# Patient Record
Sex: MALE | Race: WHITE | URBAN - METROPOLITAN AREA
[De-identification: names, ages, dates, MRNs, and addresses within clinical notes are randomized per-mention and may not be internally consistent; named-entity substitution may affect disease eponyms.]

---

## 2019-03-22 ENCOUNTER — OFFICE VISIT (OUTPATIENT)
Dept: INTERNAL MEDICINE CLINIC | Age: 44
End: 2019-03-22

## 2019-03-22 VITALS
TEMPERATURE: 98.1 F | HEIGHT: 73 IN | HEART RATE: 57 BPM | SYSTOLIC BLOOD PRESSURE: 110 MMHG | OXYGEN SATURATION: 97 % | DIASTOLIC BLOOD PRESSURE: 78 MMHG | WEIGHT: 201 LBS | RESPIRATION RATE: 18 BRPM | BODY MASS INDEX: 26.64 KG/M2

## 2019-03-22 DIAGNOSIS — G89.29 CHRONIC LOW BACK PAIN WITHOUT SCIATICA, UNSPECIFIED BACK PAIN LATERALITY: ICD-10-CM

## 2019-03-22 DIAGNOSIS — E78.2 MIXED HYPERLIPIDEMIA: ICD-10-CM

## 2019-03-22 DIAGNOSIS — E03.8 SUBCLINICAL HYPOTHYROIDISM: ICD-10-CM

## 2019-03-22 DIAGNOSIS — R10.30 LOWER ABDOMINAL PAIN: ICD-10-CM

## 2019-03-22 DIAGNOSIS — R00.1 BRADYCARDIA: Primary | ICD-10-CM

## 2019-03-22 DIAGNOSIS — M54.50 CHRONIC LOW BACK PAIN WITHOUT SCIATICA, UNSPECIFIED BACK PAIN LATERALITY: ICD-10-CM

## 2019-03-22 PROBLEM — Z00.00 ENCOUNTER FOR WELLNESS EXAMINATION: Status: ACTIVE | Noted: 2019-03-22

## 2019-03-22 NOTE — PROGRESS NOTES
HPI:  
 
This patient presents to the office for screening/wellness exam and his records from old practice not available for review. [employer] database was interrogated and data populated into EMR. His allergy and medication list were both reviewed and reconciled. He had a recent insurance physical with laboratory testing done with results remarkable for elevated TSH of 6 with normal free T4 and T3. He has been diagnosed with subclinical hypothyroidism with negative TPO antibody. He has not required pharmacotherapy. He also has persistent mild IGT although his A1C remains in the normal range. This has not changed over the past 6 years. His diet is restricted and there is no predisposing history towards diabetes. He is concerned about development of recurring episodes of fullness and tightness along the lower abdomen and groin as if his genitals were being squeezed. This occurs usually after a full meal. This is associated with urinary hesitancy but denies any hematuria, hematospermia, melena, hematochezia. It resolves when he is fully relaxed. He is concerned about possibility of malignancy as his friend had [facility] diagnosed during executive physical. 
 
He has had intermittent back pain and has been informed by a spine specialist that he had \"bulging disc\" although he cannot remember if he actually had an MRI done. He denies any paresthesias or weakness of either leg. Past Medical History:  
Diagnosis Date  Subclinical hypothyroidism 3/22/2019 Past Surgical History:  
Procedure Laterality Date  HX ROTATOR CUFF REPAIR    
 HX WISDOM TEETH EXTRACTION No current outpatient medications on file. No current facility-administered medications for this visit. Allergies and Intolerances:  
No Known Allergies Family History:  
Family History Problem Relation Age of Onset  Anxiety Mother  Hypertension Father  Thyroid Disease Father  Anxiety Father  Anxiety Sister Social History: He  reports that he has never smoked. He has never used smokeless tobacco.  
Social History Substance and Sexual Activity Alcohol Use Yes  Alcohol/week: 0.6 oz  Types: 1 Shots of liquor per week Comment: socially Immunization History:               Avoids flu vaccine Diet:                                          Lean Protein Exercise:                                  Wayney Jamie, Core, Aerobic, Weight Training Review of Systems Constitutional: Negative for chills, fever and weight loss. HENT: Negative for congestion and hearing loss. Eyes: Negative for blurred vision. Respiratory: Negative for shortness of breath. Cardiovascular: Negative for chest pain, palpitations and leg swelling. Gastrointestinal: Negative for blood in stool, constipation, heartburn, melena and nausea. Musculoskeletal: Positive for back pain. Neurological: Negative for dizziness and headaches. Psychiatric/Behavioral: Negative for depression. Physical:  
Visit Vitals /78 (BP 1 Location: Left arm, BP Patient Position: Sitting) Pulse (!) 57 Temp 98.1 °F (36.7 °C) (Oral) Resp 18 Ht 6' 1\" (1.854 m) Wt 201 lb (91.2 kg) SpO2 97% BMI 26.52 kg/m² Physical Exam  
Constitutional: He is well-developed, well-nourished, and in no distress. Pleasant man with lean muscular build HENT:  
Nose: Nose normal.  
Mouth/Throat: Oropharynx is clear and moist.  
Protruding cauliflower ears (MMA trauma) Eyes: Conjunctivae and EOM are normal. No scleral icterus. Neck: No JVD present. No thyromegaly present. Cardiovascular: Normal rate, regular rhythm, normal heart sounds and intact distal pulses. Exam reveals no gallop. No murmur heard. Pulmonary/Chest: Breath sounds normal. He has no wheezes. He has no rales. Abdominal: Bowel sounds are normal. There is no hepatosplenomegaly. There is no tenderness. Genitourinary: He exhibits no abnormal testicular mass and no testicular tenderness. Penis exhibits no lesions. Genitourinary Comments: No hernia or inguinal adenopathy, circumcised phallus Musculoskeletal:  
No LS spine tenderness to 90 degree, near complete ROM both hips Lymphadenopathy:  
  He has no cervical adenopathy. Neurological: He has normal sensation and normal strength. Gait normal.  
Reflex Scores: 
     Patellar reflexes are 0 on the right side and 0 on the left side. Achilles reflexes are 0 on the right side and 0 on the left side. Skin:  
Multiple well demarcated hyperpigmented macules along back Vitals reviewed. Review of Data:     EKG sinus bradycardia with slow R wave progression, LS spine series with scoliosis and DDD changes Labs: 
No results found for any previous visit. Health Maintenance Due Topic Date Due  
 DTaP/Tdap/Td series (1 - Tdap) 09/19/1994  Influenza Age 5 to Adult  08/01/2018 Impression/Plan: 
 Patient Active Problem List  
Diagnosis  Code Lower abdominal/Groin discomfort likely from PFD but need to consider referred pain from lumbar disc disease L1/L2 disc disease U/A ordered, arrange bladder ultrasound, consider CT imaging, pelvic floor exercises R10.30 IGT, stable and chronic, (not concerning for JULIANN) Continue to monitor glucose, low CHO diet, repeat A1C next visit R73.09  
 Subclinical hypothyroidism, with rising TSH Continue to monitor clinically, obtain repeat TSH with TPO antibody E03.9  Mixed hyperlipidemia with ACC/AHA risk score 1.2% No indication for statin therapy E78.2  
 Encounter for wellness examination Flu vaccine declined, colonoscopy at 48 along with PSA, skin surveillance through [facility] Z00.00 Danica Perdue MD

## 2019-03-22 NOTE — PATIENT INSTRUCTIONS
Lower abdominal discomfort likely from pelvic floor dysfunction>>will check bladder ultrasound and U/A Subclinical hypothyroidism>>check TPO antibody Impaired glucose tolerance>>prediabetes Cholesterol slightly elevated but calculated risk only 1.2% Degenerative disc disease + scoliosis>>? MRI lumbar spine

## 2019-03-23 LAB
APPEARANCE UR: CLEAR
BACTERIA #/AREA URNS HPF: NORMAL /[HPF]
BILIRUB UR QL STRIP: NEGATIVE
CASTS URNS QL MICRO: NORMAL /LPF
COLOR UR: YELLOW
EPI CELLS #/AREA URNS HPF: NORMAL /HPF (ref 0–10)
GLUCOSE UR QL: NEGATIVE
HGB UR QL STRIP: NEGATIVE
KETONES UR QL STRIP: NEGATIVE
LEUKOCYTE ESTERASE UR QL STRIP: NEGATIVE
MICRO URNS: NORMAL
MICRO URNS: NORMAL
NITRITE UR QL STRIP: NEGATIVE
PH UR STRIP: 7 [PH] (ref 5–7.5)
PROT UR QL STRIP: NEGATIVE
RBC #/AREA URNS HPF: NORMAL /HPF (ref 0–2)
SP GR UR: 1.01 (ref 1–1.03)
THYROPEROXIDASE AB SERPL-ACNC: 9 IU/ML (ref 0–34)
TSH SERPL DL<=0.005 MIU/L-ACNC: 3.75 UIU/ML (ref 0.45–4.5)
UROBILINOGEN UR STRIP-MCNC: 0.2 MG/DL (ref 0.2–1)
WBC #/AREA URNS HPF: NORMAL /HPF (ref 0–5)

## 2019-03-24 ENCOUNTER — TELEPHONE (OUTPATIENT)
Dept: INTERNAL MEDICINE CLINIC | Age: 44
End: 2019-03-24

## 2019-03-24 DIAGNOSIS — M54.41 ACUTE BILATERAL LOW BACK PAIN WITH BILATERAL SCIATICA: Primary | ICD-10-CM

## 2019-03-24 DIAGNOSIS — R39.9 LOWER URINARY TRACT SYMPTOMS (LUTS): ICD-10-CM

## 2019-03-24 DIAGNOSIS — M54.42 ACUTE BILATERAL LOW BACK PAIN WITH BILATERAL SCIATICA: Primary | ICD-10-CM

## 2019-03-24 PROBLEM — N50.82 SCROTAL PAIN: Status: ACTIVE | Noted: 2019-03-24

## 2019-03-24 NOTE — TELEPHONE ENCOUNTER
Spoke to wife and informed her that 's TSH went back to NORMAL and TPO antibody was NEGATIVE along with U/A.     Orders for MRI and ultrasound placed and advised to call Jennifer Macedo if she does not hear by Friday

## 2019-09-20 PROBLEM — Z00.00 ENCOUNTER FOR WELLNESS EXAMINATION: Status: RESOLVED | Noted: 2019-03-22 | Resolved: 2019-09-20
